# Patient Record
Sex: MALE | Race: WHITE | URBAN - METROPOLITAN AREA
[De-identification: names, ages, dates, MRNs, and addresses within clinical notes are randomized per-mention and may not be internally consistent; named-entity substitution may affect disease eponyms.]

---

## 2024-05-22 ENCOUNTER — TELEPHONE (OUTPATIENT)
Dept: ORTHOPEDIC SURGERY | Age: 39
End: 2024-05-22

## 2024-05-22 NOTE — TELEPHONE ENCOUNTER
Mom called said that dr hays was real close   To her and her son.he is in the hosp with his back [facility]/she want dr hays to see him asap/ let me know what to do.she wants dr hays to call her thanks

## 2024-05-23 NOTE — TELEPHONE ENCOUNTER
Pt was called on [id number] at 9.53am/a message was left to call our offic e to [facility] with dr steward.she said she would see him

## 2024-05-23 NOTE — TELEPHONE ENCOUNTER
Yes man she said he was still in the hosp.  And different doctors are  telling him something different.thanks

## 2024-06-06 ENCOUNTER — OFFICE VISIT (OUTPATIENT)
Dept: ORTHOPEDIC SURGERY | Age: 39
End: 2024-06-06
Payer: COMMERCIAL

## 2024-06-06 DIAGNOSIS — M51.26 DISC DISPLACEMENT, LUMBAR: ICD-10-CM

## 2024-06-06 DIAGNOSIS — M54.50 LOW BACK PAIN, UNSPECIFIED BACK PAIN LATERALITY, UNSPECIFIED CHRONICITY, UNSPECIFIED WHETHER SCIATICA PRESENT: Primary | ICD-10-CM

## 2024-06-06 DIAGNOSIS — M54.16 LUMBAR RADICULOPATHY: ICD-10-CM

## 2024-06-06 DIAGNOSIS — M51.36 DISC DEGENERATION, LUMBAR: ICD-10-CM

## 2024-06-06 PROCEDURE — 99204 OFFICE O/P NEW MOD 45 MIN: CPT | Performed by: PHYSICAL MEDICINE & REHABILITATION

## 2024-06-06 NOTE — PROGRESS NOTES
[facility]  Consultation Note    Patient ID:  Name: Juan C Barajas  MRN: [id number]  AGE: 38 y.o.  : 1985    Date of Consultation:  2024    CC:   Chief Complaint   Patient presents with    Back Pain         HPI:  Mr. Barajas is a 38-year-old man who presents today for evaluation of low back pain.  He is seen today on a working basis because of the severity of his pain.  He has pain in the right low back.  There is some central pain as well.  The pain radiates to the right posterior leg.  The pain is associate with paresthesias in his right toes.  The pain is aggravated with everything.  There are no alleviating factors.  He has a history of chronic intermittent low back discomfort radiating to the right leg, but the pain got much worse May 12, 2024 without specific trauma.  The pain was so severe that he was hospitalized inpatient.  He is awaiting neurosurgery consultation next week.  The pain severely affects his function.  He rates the pain as 8/10 in severity.  He has performed physician at home exercises daily since 2023 and continuing currently.  Those have not helped.  He is awaiting formal physical therapy to start in 5 days.    He brings with him today MRI lumbar spine ordered by another provider May 20, 2024.  I did review these films.  On my review of the films, he has a disc protrusion at L5-S1 resulting in contact of the bilateral S1 nerve roots, right greater than left.  There is advanced DDD L5-S1.  At L4-5, there is left lateral recess narrowing.  We discussed these findings and reviewed these images together today.    He also has discomfort in the right groin.  The pain radiates to the right anterior thigh.  I reviewed x-rays of the pelvis and right hip that were recently taken by another provider.  On my review of the films, there is no significant pelvis or hip abnormality.     Past Medical History Includes: No past medical history on file., No past

## 2024-06-12 ENCOUNTER — OFFICE VISIT (OUTPATIENT)
Dept: ORTHOPEDIC SURGERY | Age: 39
End: 2024-06-12
Payer: COMMERCIAL

## 2024-06-12 DIAGNOSIS — M51.36 DISC DEGENERATION, LUMBAR: ICD-10-CM

## 2024-06-12 DIAGNOSIS — M54.16 LUMBAR RADICULOPATHY: Primary | ICD-10-CM

## 2024-06-12 PROCEDURE — 62323 NJX INTERLAMINAR LMBR/SAC: CPT | Performed by: PHYSICAL MEDICINE & REHABILITATION

## 2024-06-12 RX ORDER — BETAMETHASONE SODIUM PHOSPHATE AND BETAMETHASONE ACETATE 3; 3 MG/ML; MG/ML
12 INJECTION, SUSPENSION INTRA-ARTICULAR; INTRALESIONAL; INTRAMUSCULAR; SOFT TISSUE ONCE
Status: COMPLETED | OUTPATIENT
Start: 2024-06-12 | End: 2024-06-12

## 2024-06-12 RX ADMIN — BETAMETHASONE SODIUM PHOSPHATE AND BETAMETHASONE ACETATE 12 MG: 3; 3 INJECTION, SUSPENSION INTRA-ARTICULAR; INTRALESIONAL; INTRAMUSCULAR; SOFT TISSUE at 14:49

## 2024-06-12 NOTE — PROGRESS NOTES
Name: Juan C Barajas  YOB: 1985  Gender: male  MRN: [id number]        Interlaminar JB Procedure Note      Procedure: L5-S1 interlaminar epidural steroid injection    Precautions: Juan C Barajas denies prior sensitivity to steroid, local anesthetic, iodine, or shellfish.       Consent:  Consent was obtained prior to the procedure. The procedure was discussed at length with Juan C Barajas. He was given the opportunity to ask questions regarding the procedure and its associated risks.  In addition to the potential risks associated with the procedure itself, the patient was informed both verbally and in writing of potential side effects of the use glucocorticoids.  The patient appeared to comprehend the informed consent and desired to have the procedure performed, and informed consent was signed.     He was placed in a prone position on the fluoroscopy table and the skin was prepped and draped in a routine sterile fashion. The areas to be injected were each anesthetized with 1 ml of 1% Lidocaine. A 22 gauge 3.5 inch spinal needle was carefully advanced under fluoroscopic guidance to the L5-S1 interlaminar space (right paramedian). 0.5 ml of 70% of Omnipaque was injected to confirm proper needle placement and absence of subdural or vascular flow Once proper placement was confirmed, 2ml of sterile water and 12 mg of betamethasone were injected through the spinal needle.       Fluoroscopic guidance was used intermittently over a 10-minute period to insure proper needle placement and his safety. A hard copy of the fluoroscopic image has been placed in his chart and is saved on the C-arm hard drive. He was monitored for 30 minutes after the procedure and discharged home in a stable fashion with a routine follow up.    Procedural Diagnosis:     ICD-10-CM    1. Lumbar radiculopathy  M54.16 XR INJ SPINE THER SUBST LUM/SAC W IMG     betamethasone acetate-betamethasone sodium phosphate (CELESTONE) injection 12 mg

## 2024-06-13 ENCOUNTER — TELEPHONE (OUTPATIENT)
Dept: ORTHOPEDIC SURGERY | Age: 39
End: 2024-06-13

## 2024-06-13 DIAGNOSIS — M25.551 RIGHT HIP PAIN: Primary | ICD-10-CM

## 2024-06-13 NOTE — TELEPHONE ENCOUNTER
His wife is calling to see about getting an MRI of his hip as discussed yesterday. He saw the neurosurgeon this morning who suggested the same thing.

## 2024-06-14 NOTE — TELEPHONE ENCOUNTER
Returned call to patient's wife, Pinky, I told her that the order for MRI Arthrogram has been placed into his chart and the hospital will reach out to him in regards to scheduling. Patient stated to understand.

## 2024-06-17 DIAGNOSIS — M25.551 RIGHT HIP PAIN: Primary | ICD-10-CM

## 2024-07-02 ENCOUNTER — HOSPITAL ENCOUNTER (OUTPATIENT)
Dept: INTERVENTIONAL RADIOLOGY/VASCULAR | Age: 39
Discharge: HOME | End: 2024-07-05
Attending: PHYSICAL MEDICINE & REHABILITATION
Payer: COMMERCIAL

## 2024-07-02 ENCOUNTER — HOSPITAL ENCOUNTER (OUTPATIENT)
Dept: MRI IMAGING | Age: 39
Discharge: HOME | End: 2024-07-05
Attending: PHYSICAL MEDICINE & REHABILITATION
Payer: COMMERCIAL

## 2024-07-02 VITALS
OXYGEN SATURATION: 96 % | RESPIRATION RATE: 16 BRPM | BODY MASS INDEX: 23.62 KG/M2 | HEIGHT: 70 IN | TEMPERATURE: 97.3 F | HEART RATE: 101 BPM | WEIGHT: 165 LBS

## 2024-07-02 DIAGNOSIS — M25.551 RIGHT HIP PAIN: ICD-10-CM

## 2024-07-02 PROCEDURE — 27093 INJECTION FOR HIP X-RAY: CPT

## 2024-07-02 PROCEDURE — A9579 GAD-BASE MR CONTRAST NOS,1ML: HCPCS | Performed by: RADIOLOGY

## 2024-07-02 PROCEDURE — 20605 DRAIN/INJ JOINT/BURSA W/O US: CPT

## 2024-07-02 PROCEDURE — 73722 MRI JOINT OF LWR EXTR W/DYE: CPT

## 2024-07-02 PROCEDURE — 6360000004 HC RX CONTRAST MEDICATION: Performed by: RADIOLOGY

## 2024-07-02 PROCEDURE — 2500000003 HC RX 250 WO HCPCS: Performed by: RADIOLOGY

## 2024-07-02 RX ORDER — DEXTROAMPHETAMINE SACCHARATE, AMPHETAMINE ASPARTATE, DEXTROAMPHETAMINE SULFATE AND AMPHETAMINE SULFATE 5; 5; 5; 5 MG/1; MG/1; MG/1; MG/1
20 TABLET ORAL 2 TIMES DAILY
COMMUNITY

## 2024-07-02 RX ORDER — ALLOPURINOL 300 MG/1
300 TABLET ORAL DAILY
COMMUNITY

## 2024-07-02 RX ORDER — LIDOCAINE HYDROCHLORIDE 10 MG/ML
INJECTION, SOLUTION EPIDURAL; INFILTRATION; INTRACAUDAL; PERINEURAL PRN
Status: DISCONTINUED | OUTPATIENT
Start: 2024-07-02 | End: 2024-07-06 | Stop reason: HOSPADM

## 2024-07-02 RX ADMIN — IOPAMIDOL 5 ML: 612 INJECTION, SOLUTION INTRAVENOUS at 15:09

## 2024-07-02 RX ADMIN — LIDOCAINE HYDROCHLORIDE 5 ML: 10 INJECTION, SOLUTION EPIDURAL; INFILTRATION; INTRACAUDAL; PERINEURAL at 15:06

## 2024-07-02 RX ADMIN — GADOTERIDOL 1.5 ML: 279.3 INJECTION, SOLUTION INTRAVENOUS at 15:07

## 2024-07-02 ASSESSMENT — PAIN - FUNCTIONAL ASSESSMENT: PAIN_FUNCTIONAL_ASSESSMENT: NONE - DENIES PAIN

## 2024-07-02 NOTE — DISCHARGE INSTRUCTIONS
If you have any questions about your procedure, please call the Interventional Radiology department at [phone].      After business hours (5pm) and weekends, call the answering service at [phone] and ask for the Radiologist on call to be paged.

## 2024-07-02 NOTE — BRIEF OP NOTE
[facility]  Department of Interventional Radiology  [phone]        Brief Procedure Note    Patient: Juan C Barajas MRN: [id number]  SSN: [id number]    YOB: 1985  Age: 38 y.o.  Sex: male      Date of Procedure: 7/2/2024     Pre-Procedure Diagnosis:   Right hip pain    Post-Procedure Diagnosis:  SAME    Procedure(s):  Arthrogram    Brief Description of Procedure:  as above    Performed By:  JOANIE MEDINA MD     Assistants:  None    Anesthesia:  Lidocaine    Estimated Blood Loss:  None    Specimens:  None    Implants:  None    Findings:  Unremarkable    Complications:  None    Recommendations:  To MRI     Follow Up:  PRN    Signed By: JOANIE MEDINA MD     July 2, 2024

## 2024-07-08 ENCOUNTER — TELEPHONE (OUTPATIENT)
Dept: ORTHOPEDIC SURGERY | Age: 39
End: 2024-07-08

## 2024-07-10 ENCOUNTER — TELEPHONE (OUTPATIENT)
Dept: ORTHOPEDIC SURGERY | Age: 39
End: 2024-07-10

## 2024-07-25 ENCOUNTER — OFFICE VISIT (OUTPATIENT)
Dept: ORTHOPEDIC SURGERY | Age: 39
End: 2024-07-25

## 2024-07-25 VITALS — WEIGHT: 168 LBS | BODY MASS INDEX: 24.05 KG/M2 | HEIGHT: 70 IN

## 2024-07-25 DIAGNOSIS — S73.191A TEAR OF RIGHT ACETABULAR LABRUM, INITIAL ENCOUNTER: Primary | ICD-10-CM

## 2024-07-25 DIAGNOSIS — M25.551 RIGHT HIP PAIN: ICD-10-CM

## 2024-07-25 DIAGNOSIS — M25.859 FEMORAL ACETABULAR IMPINGEMENT: ICD-10-CM

## 2024-07-25 RX ORDER — TRIAMCINOLONE ACETONIDE 40 MG/ML
40 INJECTION, SUSPENSION INTRA-ARTICULAR; INTRAMUSCULAR ONCE
Status: COMPLETED | OUTPATIENT
Start: 2024-07-25 | End: 2024-07-25

## 2024-07-25 RX ADMIN — TRIAMCINOLONE ACETONIDE 40 MG: 40 INJECTION, SUSPENSION INTRA-ARTICULAR; INTRAMUSCULAR at 11:15

## 2024-07-25 NOTE — PROGRESS NOTES
Name: Juan C Barajas  YOB: 1985  Gender: male  MRN: [id number]      CC: Hip Pain (R)       HPI: Juan C Barajas is a 38 y.o. male who presents with Hip Pain (R)  . He is a new patient of [facility] referred by Dr Land. She has been treating him for low back pain since early June. He received a cortisone injection in his back last month which has helped with his back pain. He started noticing pain in his right hip around May. He has not mechanism of injury. He is not 100% sure when his hip may have started hurting him since he has been dealing with back pain. He notes discomfort in his right groin and anterior thigh. Every once in a while he does notice a click or locking sensation, but not often.  Describes his pain in a C sign type distribution has pain when he sits for prolonged period of time and then stands up.  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          ROS/Meds/PSH/PMH/FH/SH: I personally reviewed the patients standard intake form.  Below are the pertinents    Tobacco:  reports that he has been smoking cigarettes. He has never used smokeless tobacco.  Diabetes: none  Other: ADD    Physical Examination:  General: no acute distress  Lungs: breathing easily  CV: regular rhythm by pulse  Right Hip: Mild discomfort with logroll.  Flexion to 100 degrees internal rotation only about 5 external to 40 positive FADIR impingement mild pain with ARASELI.  Good resisted strength.      Imaging:   Hip/pelvis XR: SERENA 4 views     Clinical Indication  1. Tear of right acetabular labrum, initial encounter    2. Right hip pain    3. Femoral acetabular impingement           Report: AP, martins and frog lateral, false profile x-ray of the Right hip demonstrates maintenance of joint space with minimal degenerative changes there is a significant cam deformity with loss of

## 2024-08-06 ENCOUNTER — TELEPHONE (OUTPATIENT)
Age: 39
End: 2024-08-06

## 2024-08-06 NOTE — TELEPHONE ENCOUNTER
PARUL to move 8/21 appt with Henry to 8/22 with Gael as that is the therapy order states to schedule with Gael

## 2024-08-22 ENCOUNTER — EVALUATION (OUTPATIENT)
Age: 39
End: 2024-08-22
Payer: COMMERCIAL

## 2024-08-22 DIAGNOSIS — G89.29 CHRONIC RIGHT-SIDED LOW BACK PAIN WITH RIGHT-SIDED SCIATICA: ICD-10-CM

## 2024-08-22 DIAGNOSIS — R29.898 WEAKNESS OF BOTH HIPS: ICD-10-CM

## 2024-08-22 DIAGNOSIS — M54.41 CHRONIC RIGHT-SIDED LOW BACK PAIN WITH RIGHT-SIDED SCIATICA: ICD-10-CM

## 2024-08-22 DIAGNOSIS — M25.551 PAIN OF RIGHT HIP: Primary | ICD-10-CM

## 2024-08-22 DIAGNOSIS — M25.651 DECREASED RANGE OF RIGHT HIP MOVEMENT: ICD-10-CM

## 2024-08-22 DIAGNOSIS — M25.60 JOINT STIFFNESS OF SPINE: ICD-10-CM

## 2024-08-22 DIAGNOSIS — Z78.9 IMPAIRED MOTOR CONTROL: ICD-10-CM

## 2024-08-22 DIAGNOSIS — Z74.09 DECREASED FUNCTIONAL MOBILITY AND ENDURANCE: ICD-10-CM

## 2024-08-22 PROCEDURE — 97110 THERAPEUTIC EXERCISES: CPT

## 2024-08-22 PROCEDURE — 97162 PT EVAL MOD COMPLEX 30 MIN: CPT

## 2024-08-22 PROCEDURE — 97140 MANUAL THERAPY 1/> REGIONS: CPT

## 2024-08-22 NOTE — PROGRESS NOTES
strength to 4+/5 without pain for improved engagement with functional activity.    Long term goals to be met by 10/21/2024 (60 days)  Pt will demonstrate full pain free trunk and hip AROM in order to perform functional ADL's and work duties without difficulty.  Pt will increase strength globally with MMT to at least 5/5 for appropriate performance of work and functional activities.  Pt will test negatively for hip pathology, indicating appropriate muscular coordination and healing of soft tissue structures  Pt will return to previous level of function without complaint of pain.  Pt will improve score on the Hip Outcome Score to </= 20 % disability, demonstrating improved overall function.    Itiva  Access Code: [email or web address]  URL: [email or web address]  Date: 08/22/2024  Prepared by: Gael Martinez, PT, DPT, SCS    Exercises  - Supine Sciatic Nerve Glide  - 2 x daily - 15 reps - 5 hold  - Supine Lower Trunk Rotation  - 2 x daily - 15 reps - 2 hold  - Figure 4 Rotation  - 2 x daily - 15 reps - 2 hold  - Dead Bug with Swiss Ball  - 1 x daily - 2 sets - 10 reps - 3 hold  - Supine Posterior Pelvic Tilt  - 2 x daily - 2 sets - 15 reps - 3 hold

## 2024-08-25 NOTE — PROGRESS NOTES
[facility]  [address] [place] [id number]  Dept: [phone]     Physical Therapy Daily Note     Referring MD: No ref. provider found  Diagnosis:    Diagnosis Orders   1. Pain of right hip        2. Chronic right-sided low back pain with right-sided sciatica        3. Weakness of both hips        4. Decreased range of right hip movement        5. Joint stiffness of spine               Surgery: n/a    Therapy precautions:None  Co-morbidities affecting plan of care: smoker    Total Timed Codes: 40 min, Total Treatment Time: 40 min  Modifier needed: No    PERTINENT MEDICAL HISTORY     Past medical and surgical history:   No past medical history on file.   No past surgical history on file.  Medications: reviewed in chart   Allergies:   Allergies   Allergen Reactions    Codeine Other (See Comments)     HAS NO IDEA- WAS TOLD BY HIS MAMA \"AS A CHILD\"      Diagnostic exams (per chart review):   MRI arthrogram demonstrates some anterior superior acetabular labral tearing contralateral separation the some chondral thinning of the superior acetabulum. No subchondral edema.     Chief complaints/history of injury: Patient reports a longstanding history of lumbar pain, mostly on the R side, with episodic flares. 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 He does not recall a specific injury to his hip, but approximately 5 months ago, he recalls getting out of bed, and having pain in the R hip when walking. This led to a LBP flare, with sciatica. He has received a lumbar injection which has helped with his symptoms there, but maintains pain deep in the R anterolateral hip.    Date symptoms began: R hip: 5 months  Nature of condition: Chronic (continuous duration > 3 months)  Primary cause of current episode: Repetitive  How did symptoms start: see above  Describe current symptoms: Lumbar: R side stiffness with long duration postures, R hip: pain in

## 2024-08-28 ENCOUNTER — TREATMENT (OUTPATIENT)
Age: 39
End: 2024-08-28

## 2024-08-28 DIAGNOSIS — M25.551 PAIN OF RIGHT HIP: Primary | ICD-10-CM

## 2024-08-28 DIAGNOSIS — G89.29 CHRONIC RIGHT-SIDED LOW BACK PAIN WITH RIGHT-SIDED SCIATICA: ICD-10-CM

## 2024-08-28 DIAGNOSIS — M25.651 DECREASED RANGE OF RIGHT HIP MOVEMENT: ICD-10-CM

## 2024-08-28 DIAGNOSIS — M54.41 CHRONIC RIGHT-SIDED LOW BACK PAIN WITH RIGHT-SIDED SCIATICA: ICD-10-CM

## 2024-08-28 DIAGNOSIS — R29.898 WEAKNESS OF BOTH HIPS: ICD-10-CM

## 2024-08-28 DIAGNOSIS — M25.60 JOINT STIFFNESS OF SPINE: ICD-10-CM
